# Patient Record
Sex: FEMALE | Race: WHITE
[De-identification: names, ages, dates, MRNs, and addresses within clinical notes are randomized per-mention and may not be internally consistent; named-entity substitution may affect disease eponyms.]

---

## 2018-03-27 ENCOUNTER — HOSPITAL ENCOUNTER (EMERGENCY)
Dept: HOSPITAL 68 - ERH | Age: 38
End: 2018-03-27
Payer: COMMERCIAL

## 2018-03-27 VITALS — SYSTOLIC BLOOD PRESSURE: 119 MMHG | DIASTOLIC BLOOD PRESSURE: 57 MMHG

## 2018-03-27 VITALS — WEIGHT: 250 LBS | HEIGHT: 66 IN | BODY MASS INDEX: 40.18 KG/M2

## 2018-03-27 DIAGNOSIS — Z3A.36: ICD-10-CM

## 2018-03-27 DIAGNOSIS — R19.7: ICD-10-CM

## 2018-03-27 DIAGNOSIS — O21.9: Primary | ICD-10-CM

## 2018-03-27 LAB
ABSOLUTE GRANULOCYTE CT: 10.8 /CUMM (ref 1.4–6.5)
BASOPHILS # BLD: 0 /CUMM (ref 0–0.2)
BASOPHILS NFR BLD: 0.2 % (ref 0–2)
EOSINOPHIL # BLD: 0.1 /CUMM (ref 0–0.7)
EOSINOPHIL NFR BLD: 1.1 % (ref 0–5)
ERYTHROCYTE [DISTWIDTH] IN BLOOD BY AUTOMATED COUNT: 14.9 % (ref 11.5–14.5)
GRANULOCYTES NFR BLD: 86 % (ref 42.2–75.2)
HCT VFR BLD CALC: 37 % (ref 37–47)
LYMPHOCYTES # BLD: 1 /CUMM (ref 1.2–3.4)
MCH RBC QN AUTO: 31 PG (ref 27–31)
MCHC RBC AUTO-ENTMCNC: 33.8 G/DL (ref 33–37)
MCV RBC AUTO: 91.5 FL (ref 81–99)
MONOCYTES # BLD: 0.6 /CUMM (ref 0.1–0.6)
PLATELET # BLD: 319 /CUMM (ref 130–400)
PMV BLD AUTO: 7.7 FL (ref 7.4–10.4)
RED BLOOD CELL CT: 4.04 /CUMM (ref 4.2–5.4)
WBC # BLD AUTO: 12.6 /CUMM (ref 4.8–10.8)

## 2018-03-27 NOTE — ED GENERAL ADULT
History of Present Illness
 
General
Chief Complaint: Nausea, Vomiting, Diarrhea
Stated Complaint: 36 WKS PREGNANT, NVD
Source: patient, family
Exam Limitations: no limitations
Allergies
Coded Allergies:
Sulfa (Sulfonamide Antibiotics) (Intermediate, RASH/HIVES 03/27/18)
adhesive tape (Intermediate, ITCHING 03/27/18)
latex (Intermediate, ITCHY 03/27/18)
 
Reconcile Medications
Cholecalciferol (Vitamin D3) 1,000 UNIT TABLET   1 TAB PO DAILY VITAMIN SUPPORT 
(Reported)
Doxylamine/Pyridoxine HCl (Diclegis Dr 10-10 MG Tablet) 10 MG-10 MG TABLET.DR   
1-2 TAB PO QPM PRN NAUSEA
Glyburide 5 MG TABLET   1 TAB PO DAILY DIABETES  (Reported)
Metformin HCl 500 MG TABLET   2 TAB PO DAILY DIABETES  (Reported)
Nitrofurantoin Monohyd/M-Cryst (Macrobid 100 MG Capsule) 100 MG CAPSULE   1 CAP 
PO BID UTI
     with food
Prenatal Vit No.130/Iron/FA (Prenatal Tablet) 27 MG IRON-800 MCG TABLET   1 TAB 
PO DAILY PREGNANCY  (Reported)
 
Triage Note:
PT TO ED WITH C/O DIARRHEA SINCE YESTERDAY AND
VOMITED X 1 THIS MORNING. PT IS 36 AND 3 DAYS.PT
DENIES ABD CRAMPING OR BLEEDING.
Triage Nurses Notes Reviewed? yes
Onset: Gradual
Duration: day(s): (1)
Timing: remote history
Injury Environment: home
Severity: moderate
No Modifying Factors: none
Associated Symptoms: NAUSEA
Pregnant: Yes
Patient currently breastfeeds: No
HPI:
Patient is a 38-year-old female, currently 36 weeks pregnant presenting to the 
emergency department with chief complaint of nausea vomiting and diarrhea that 
began yesterday evening around 7 PM.  Patient reports approximately 10 episodes 
of loose watery stool that started yesterday.  One episode of vomiting this 
morning.  No blood in the stool or vomit.  Denies any vaginal bleeding or 
discharge.  Denies any urinary frequency urgency or dysuria.  Patient has been 
able to eat a piece of toast and half a cup of Gatorade this morning without 
vomiting.  Last episode of diarrhea or vomiting was around 9 AM this morning.  
Denies any associated abdominal pain.  Her due date is April 16.  This IS HER 
third pregnancy.
(Delbert ROSENBAUM,Loni)
 
Vital Signs & Intake/Output
Vital Signs & Intake/Output
 Vital Signs
 
 
Date Time Temp Pulse Resp B/P B/P Pulse O2 O2 Flow FiO2
 
     Mean Ox Delivery Rate 
 
03/27 1500 98.0 69 20 119/57  99 Room Air  
 
03/27 1340      99 Room Air  
 
03/27 1132 97.6 86 18 129/86  98 Room Air Room Air 
 
 
 
(Vania CEDILLO,Jona SWENSON)
 
Past History
 
Travel History
Traveled to [place] past 21 day No
 
Medical History
Any Pertinent Medical History? see below for history
Neurological: migraine
EENT: NONE
Cardiovascular: NONE
Respiratory: NONE
Gastrointestinal: NONE
Hepatic: NONE
Renal: NONE
Musculoskeletal: NONE
Psychiatric: NONE
Endocrine: NONE
Blood Disorders: NONE
Cancer(s): NONE
 
Surgical History
Surgical History: non-contributory
 
Psychosocial History
What is your primary language English
Tobacco Use: Never used
ETOH Use: denies use
Illicit Drug Use: denies illicit drug use
 
Family History
Hx Contributory? No
(Loni Walton)
 
Review of Systems
 
Review of Systems
Constitutional:
Reports: no symptoms. 
Comments
Review of systems: See HPI, All other systems negative.
Constitutional, no chills fever or weight loss
HEENT: No visual changes no sore throat no congestion
Cardiovascular: No chest pain ,palpitation , orthopnea or ankle swelling
Skin, no jaundice no rashes
Respiratory: No dyspnea cough sputum or hemoptysis
GI: No nausea no vomiting
: No dysuria No hematuria
Muscle skeletal: no back pain, no neck pain,
Neurologic: No numbness no confusion, no headaches
Psych: No stress anxiety or depression,.
Heme/endocrine: No bruising no bleeding no polyuria or polydipsia
Immunology: No splenectomy or history of AIDS
(Loni Walton)
 
Physical Exam
 
Physical Exam
General Appearance: well developed/nourished, no apparent distress, alert, awake
, comfortable
Comments:
Well-developed well-nourished person in no acute distress
HEENT:  Pupils equally round and reactive to light and accommodation. Nose is 
atraumatic. External auditory canal and Tympanic membranes clear. Pharynx 
normal. No swelling or edema.  Moist oral mucosa.
Neck: Full range of motion.  Normal inspection.
Back: Nontender, no CVA tenderness. 
Cardiovascular: Regular rate and rhythms no murmurs rubs or gallops
Respiratory: Chest nontender. No respiratory distress.breath sounds clear to 
auscultation bilaterally
Abdomen: Soft, pregnant abdomen, nontender nondistended, no appreciable 
organomegaly. Normal bowel sounds.  Fetal tones in the 150s confirmed by 
childbirth nurse.
Extremity: No edema, no calf tenderness to palpation, normal and equal pulses.
Neuro: Alert oriented x3
Skin: No appreciable rash on exposed skin, skin is warm and dry.
Psych: Mood and affect is normal, memory and judgment is normal.
 
Core Measures
ACS in differential dx? No
CVA/TIA Diagnosis: No
Sepsis Present: No
Sepsis Focused Exam Completed? No
(Loni Walton)
 
Progress
Differential Diagnoses
I considered the following diagnoses in my evaluation of the patient:  
Dehydration, electrolyte abnormality, gastritis, gastroenteritis, COLITIS
 
Initial ED EKG: none
(Loni Walton)
Plan of Care:
 Orders
 
 
Procedure Date/time Status
 
Add-on Test (ER Only) 03/27 1427 Active
 
URINALYSIS 03/27 1209 Complete
 
LIPASE 03/27 1209 Complete
 
COMPREHENSIVE METABOLIC PANEL 03/27 1209 Complete
 
CBC WITHOUT DIFFERENTIAL 03/27 1209 Complete
 
CULTURE,URINE 03/27 1200 Active
 
 
 Laboratory Tests
 
 
 
03/27/18 1250:
Anion Gap 10, Estimated GFR > 60, BUN/Creatinine Ratio 14.0, Glucose 83, Calcium
9.5, Total Bilirubin 0.5, AST 18, ALT 17, Alkaline Phosphatase 90, Total Protein
6.0  L, Albumin 3.1  L, Globulin 2.9, Albumin/Globulin Ratio 1.1, Lipase 71, CBC
w Diff NO MAN DIFF REQ, RBC 4.04  L, MCV 91.5, MCH 31.0, MCHC 33.8, RDW 14.9  H,
MPV 7.7, Gran % 86.0  H, Lymphocytes % 8.3  L, Monocytes % 4.4, Eosinophils % 
1.1, Basophils % 0.2, Absolute Granulocytes 10.8  H, Absolute Lymphocytes 1.0  L
, Absolute Monocytes 0.6, Absolute Eosinophils 0.1, Absolute Basophils 0, 
Urinalysis LIGHT  H, Urine Color YEL, Urine Clarity CLEAR, Urine pH 6.0, Ur 
Specific Gravity 1.010, Urine Protein NEG, Urine Ketones NEG, Urine Nitrite NEG,
Urine Bilirubin NEG, Urine Urobilinogen 0.2, Ur Leukocyte Esterase MOD  H, Ur 
Microscopic SEDIMENT EXAMINED, Urine WBC 5-10  H, Ur Epithelial Cells MANY  H, 
Urine Bacteria FEW  H, Urine Hemoglobin NEG, Urine Glucose NEG
 Microbiology
03/27 1200  URINE ROUT: Urine Culture - RECD
 
 
 
3/27/2018 3:40:52 PM patient feeling improved after IV hydration, also feeling 
improved after taking DICLEGIS.  Tolerating by mouth fluids and crackers without
nausea or vomiting.  Spoke with Dr. Ocampo, OB/GYN covering for patient's OB/GYN,
recommending patient follow up outpatient with her.  Not toxic.  Discussed with 
Dr. Caro, agrees with plan.
(Loni Walton)
(Vania CEDILLO,Jona SWENSON)
 
Departure
 
Departure
Disposition: HOME OR SELF CARE
Condition: Stable
Clinical Impression
Primary Impression: Nausea vomiting and diarrhea
Referrals:
Mary Hobson (PCP/Family)
 
Jerad Fried MD
 
Additional Instructions:
Follow-up with OB/GYN, call tomorrow.  Return for worsening symptoms or 
concerns. TAKE DICLEGIS, TAKE AS DIRECTED. INCREASE FLUIDS. RETURN FOR WORSENING
SYMPTOMS. 
Departure Forms:
Customer Survey
General Discharge Information
Prescriptions:
Current Visit Scripts
Doxylamine/Pyridoxine HCl (Diclegis Dr 10-10 MG Tablet) 1-2 TAB PO QPM PRN 
NAUSEA 
     #20 TAB 
 
Nitrofurantoin Monohyd/M-Cryst (Macrobid 100 MG Capsule) 1 CAP PO BID  
     #14 CAP 
     with food
 
 
(Loni Walton)
 
PA/NP Co-Sign Statement
Statement:
ED Attending supervision documentation-
 
[x] I saw and evaluated the patient. I have also reviewed all the pertinent lab 
results and diagnostic results. I agree with the findings and the plan of care 
as documented in the PA's/NP's documentation.  Patient presents for evaluation 
of nausea vomiting and diarrhea during pregnancy.  Physical examination reveals 
a gravid abdomen is otherwise nontender.
 
[] I have reviewed the ED Record and agree with the PA's/NP's documentation.
 
[] Additions or exceptions (if any) to the PAs/NP's note and plan are 
summarized below:
[]
 
(Vania CEDILLO,Jona SWENSON)
 
Critical Care Note
 
Critical Care Note
Critical Care Time: non-applicable
(Loni Walton)

## 2018-04-05 ENCOUNTER — HOSPITAL ENCOUNTER (INPATIENT)
Dept: HOSPITAL 68 - GNO | Age: 38
LOS: 2 days | End: 2018-04-07
Attending: OBSTETRICS & GYNECOLOGY | Admitting: OBSTETRICS & GYNECOLOGY
Payer: COMMERCIAL

## 2018-04-05 VITALS — HEIGHT: 67 IN | WEIGHT: 243 LBS | BODY MASS INDEX: 38.14 KG/M2

## 2018-04-05 VITALS — SYSTOLIC BLOOD PRESSURE: 122 MMHG | DIASTOLIC BLOOD PRESSURE: 78 MMHG

## 2018-04-05 DIAGNOSIS — Z3A.37: ICD-10-CM

## 2018-04-05 LAB
ABSOLUTE GRANULOCYTE CT: 11.5 /CUMM (ref 1.4–6.5)
BASOPHILS # BLD: 0 /CUMM (ref 0–0.2)
BASOPHILS NFR BLD: 0.2 % (ref 0–2)
EOSINOPHIL # BLD: 0.1 /CUMM (ref 0–0.7)
EOSINOPHIL NFR BLD: 1 % (ref 0–5)
ERYTHROCYTE [DISTWIDTH] IN BLOOD BY AUTOMATED COUNT: 15.2 % (ref 11.5–14.5)
GRANULOCYTES NFR BLD: 83.5 % (ref 42.2–75.2)
HCT VFR BLD CALC: 36.9 % (ref 37–47)
LYMPHOCYTES # BLD: 1.5 /CUMM (ref 1.2–3.4)
MCH RBC QN AUTO: 31.2 PG (ref 27–31)
MCHC RBC AUTO-ENTMCNC: 34.4 G/DL (ref 33–37)
MCV RBC AUTO: 90.6 FL (ref 81–99)
MONOCYTES # BLD: 0.6 /CUMM (ref 0.1–0.6)
PLATELET # BLD: 331 /CUMM (ref 130–400)
PMV BLD AUTO: 7.8 FL (ref 7.4–10.4)
RED BLOOD CELL CT: 4.07 /CUMM (ref 4.2–5.4)
WBC # BLD AUTO: 13.8 /CUMM (ref 4.8–10.8)

## 2018-04-05 PROCEDURE — 10H073Z INSERTION OF MONITORING ELECTRODE INTO PRODUCTS OF CONCEPTION, VIA NATURAL OR ARTIFICIAL OPENING: ICD-10-PCS | Performed by: OBSTETRICS & GYNECOLOGY

## 2018-04-05 PROCEDURE — 10907ZC DRAINAGE OF AMNIOTIC FLUID, THERAPEUTIC FROM PRODUCTS OF CONCEPTION, VIA NATURAL OR ARTIFICIAL OPENING: ICD-10-PCS | Performed by: OBSTETRICS & GYNECOLOGY

## 2018-04-05 PROCEDURE — 4A1H74Z MONITORING OF PRODUCTS OF CONCEPTION, CARDIAC ELECTRICAL ACTIVITY, VIA NATURAL OR ARTIFICIAL OPENING: ICD-10-PCS | Performed by: OBSTETRICS & GYNECOLOGY

## 2018-04-05 NOTE — PN- OBGYN
Surgical Brief Attending Note
Brief Attending Note:
Seen and evaluated
Castagory 2 tracing.  Variable decelerations noted
8/100/-1 ROP
No molding
IUPC placed
Plan for amnioinfusion to correct variable decelerations. 
Anticipate vaginal birth.
Plan reviewed with Nursing and patient/family.

## 2018-04-05 NOTE — PN- OBGYN
Surgical Brief Attending Note
Brief Attending Note:
Comfortable with epidural
/60
Catagory 1 tracing
VE 5-6/90/-2
Gynecoid pelvis
AROM clear but reduced amount of fluid.
Hernandez placed and clear urine
 
Anticipate vaginal birth
Good labor progress
PPH precautions reviewed with Nursing as well.

## 2018-04-05 NOTE — LABOR & DELIVERY SUMMARY
Delivery Summary
Vaginal Delivery:
   Vaginal: vertex
Episiotomy/Lacerations:
   Episiotomy/Lacerations: 1st degree right labial not bleeding
Placenta:
   Placenta: spontanteous, normal, 3 vessel, nuchal cord (x_), nuchal x 1 
reducible
Anesthesia: epiodural
Apgars - 1 Min: 8
Apgars - 5 Min: 9
Additional Comments:
Cord gas sent
Placenta to pathology given HTN and GDMa2 
Uterus tonic wtih oxytocin
 cc
Rectum intact
Peds present for delivery.

## 2018-04-05 NOTE — PN- OBGYN
Surgical Brief Attending Note
Brief Attending Note:
Patient is a 38 year old  at 37 plus weeks with pregnancy complicated by HTN
, GDMA2 and obesity who is now in early active labor with Catagory 2 tracing.
Patient is making progress and was 1 cm on 18 and 18 was 2-3.
Vitals are 120-80 FSG 80s
 
Plan for 1) Admission for labor  2) Observation for labor changes 3) GBBS 
negative  4) No findings of PIH at present  5) Non LGA fetus and at time of SROM
or AROM potential for meconium  6) GDM so check FSG q2 hours to maintain between
.  Potential for  hypoglycemia was reviewed.  Shoulder precautions
given GDM and obesity.
Full note to follow.
Plan of care reviewed with Nursing team and patient.

## 2018-04-06 LAB
ABSOLUTE GRANULOCYTE CT: 9.7 /CUMM (ref 1.4–6.5)
BASOPHILS # BLD: 0 /CUMM (ref 0–0.2)
BASOPHILS NFR BLD: 0.1 % (ref 0–2)
EOSINOPHIL # BLD: 0.3 /CUMM (ref 0–0.7)
EOSINOPHIL NFR BLD: 2.4 % (ref 0–5)
ERYTHROCYTE [DISTWIDTH] IN BLOOD BY AUTOMATED COUNT: 15.4 % (ref 11.5–14.5)
GRANULOCYTES NFR BLD: 75.7 % (ref 42.2–75.2)
HCT VFR BLD CALC: 35.1 % (ref 37–47)
LYMPHOCYTES # BLD: 1.8 /CUMM (ref 1.2–3.4)
MCH RBC QN AUTO: 31.4 PG (ref 27–31)
MCHC RBC AUTO-ENTMCNC: 33.7 G/DL (ref 33–37)
MCV RBC AUTO: 93.2 FL (ref 81–99)
MONOCYTES # BLD: 1 /CUMM (ref 0.1–0.6)
PLATELET # BLD: 304 /CUMM (ref 130–400)
PMV BLD AUTO: 8.1 FL (ref 7.4–10.4)
RED BLOOD CELL CT: 3.77 /CUMM (ref 4.2–5.4)
WBC # BLD AUTO: 12.8 /CUMM (ref 4.8–10.8)

## 2018-04-06 NOTE — PN- POST DELIVERY/GYN
Subjective
Subjective:
Doing well
Perineal discomfort
 
Review of Systems
Constitutional:
Denies: no symptoms. 
EENTM:
Denies: no symptoms. 
Cardiovascular:
Denies: no symptoms. 
Respiratory:
Denies: no symptoms. 
Gastrointestinal:
Denies: no symptoms. 
Genitourinary:
Denies: no symptoms. 
Musculoskeletal:
Denies: no symptoms. 
Skin:
Denies: no symptoms. 
Neurological/Psychological:
Denies: no symptoms. 
Hematologic/Endocrine:
Denies: no symptoms. 
Immunologic/Allergic:
Denies: no symptoms. 
 
Objective
Last 24 Hrs of Vital Signs/I&O
 Vital Signs
 
 
Date Time Temp Pulse Resp B/P B/P Pulse O2 O2 Flow FiO2
 
     Mean Ox Delivery Rate 
 
04/05 1035    122/78     
 
 
 
 
Physical Exam
General Appearance Alert, Oriented X3, Cooperative, No Acute Distress
Skin No Rashes
HEENT Atraumatic
Neck Supple
Cardiovascular Regular Rate
Lungs Clear to Auscultation, Normal Air Movement
Abdomen Normal Bowel Sounds, Soft
Neurological Normal Gait, Normal Speech, Strength at 5/5 X4 Ext, Normal Tone, 
Sensation Intact, Cranial Nerves 3-12 NL
Extremities Edmea bilaterally 1-2+
Breasts Breast appear nl
Current Medications:
 Current Medications
 
 
  Sig/Brody Start time  Last
 
Medication Dose Route Stop Time Status Admin
 
Acetaminophen 650 MG Q4P PRN 04/05 1515 AC 
 
  PO   
 
Bupivacaine HCl 10 ML .STK-MED ONE 04/05 1458 DC 
 
  EPID 04/05 1459  
 
Hydroxyzine HCl 50 MG AT BEDTIME AS NEED.. 04/05 1515 AC 
 
  PO   
 
Ibuprofen 800 MG .STK-MED ONE 04/06 0146 DC 
 
  PO 04/06 0147  
 
Ibuprofen 800 MG Q6P PRN 04/05 1515 AC 04/06
 
  PO   0734
 
Lactated Ringer's 1,000 ML Q8H 04/05 1000 DC 04/05
 
  IV   1447
 
Methylergonovine  0.2 MG ONCE ONE 04/05 1715 DC 
 
Maleate  IM 04/05 1716  
 
Multivitamins 1 TAB DAILY 04/06 1000 AC 
 
  PO   
 
Oxytocin 20 UNITS Q5H 04/05 1515 DC 
 
Lactated Ringer's 1,000 ML IV 04/05 2014  
 
 
 
Last 24 Hrs of Labs/Zack:
 Laboratory Tests
 
04/06/18 0730:
CBC w Diff NO MAN DIFF REQ, RBC 3.77  L, MCV 93.2, MCH 31.4  H, MCHC 33.7, RDW 
15.4  H, MPV 8.1, Gran % 75.7  H, Lymphocytes % 13.7  L, Monocytes % 8.1, 
Eosinophils % 2.4, Basophils % 0.1, Absolute Granulocytes 9.7  H, Absolute 
Lymphocytes 1.8, Absolute Monocytes 1.0  H, Absolute Eosinophils 0.3, Absolute 
Basophils 0
 Microbiology
04/05 1215  URINE ROUT: Urine Culture - RES
 
 
 
Assessment/Plan
Assessment/Plan
Postpartum
History of GDMA2
History of HTN
Obesity
 
Follow up 6-12 weeks for 75 g GTT vs fasting glucose to assess for overy 
diabetes
Non breast feeding
Nutritional counseling.  Gained 50 lbs. 
GBBS negative and no findings of infection
VTE risk reduction with ambulation
BP is wnl.  No need for anti HTN meds at present
DC planning for 4/7/18 and circumcision today
4 week follow up and IUD at 6 weeks.
Problem List:
 1. History of gestational diabetes
 
 2. Pregnancy
 
 
Attending MD Review Statement
 
Attending Statement
Attending MD Statement: examined this patient, discussed with nursing
Attending Assessment/Plan:
As outlined
Up to date with Influenza and Tdap vaccines

## 2018-04-10 ENCOUNTER — HOSPITAL ENCOUNTER (INPATIENT)
Dept: HOSPITAL 68 - ERH | Age: 38
LOS: 2 days | DRG: 776 | End: 2018-04-12
Attending: OBSTETRICS & GYNECOLOGY | Admitting: OBSTETRICS & GYNECOLOGY
Payer: COMMERCIAL

## 2018-04-10 VITALS — WEIGHT: 248 LBS | HEIGHT: 66 IN | BODY MASS INDEX: 39.86 KG/M2

## 2018-04-10 DIAGNOSIS — Z91.040: ICD-10-CM

## 2018-04-10 DIAGNOSIS — E83.51: ICD-10-CM

## 2018-04-10 DIAGNOSIS — E83.39: ICD-10-CM

## 2018-04-10 DIAGNOSIS — E83.41: ICD-10-CM

## 2018-04-10 DIAGNOSIS — O99.89: ICD-10-CM

## 2018-04-10 DIAGNOSIS — Z91.048: ICD-10-CM

## 2018-04-10 DIAGNOSIS — Z88.2: ICD-10-CM

## 2018-04-10 DIAGNOSIS — G43.909: ICD-10-CM

## 2018-04-10 LAB
ABSOLUTE GRANULOCYTE CT: 5.5 /CUMM (ref 1.4–6.5)
BASOPHILS # BLD: 0.1 /CUMM (ref 0–0.2)
BASOPHILS NFR BLD: 0.7 % (ref 0–2)
EOSINOPHIL # BLD: 0.7 /CUMM (ref 0–0.7)
EOSINOPHIL NFR BLD: 7.9 % (ref 0–5)
ERYTHROCYTE [DISTWIDTH] IN BLOOD BY AUTOMATED COUNT: 14.8 % (ref 11.5–14.5)
GRANULOCYTES NFR BLD: 58.4 % (ref 42.2–75.2)
HCT VFR BLD CALC: 35.7 % (ref 37–47)
LYMPHOCYTES # BLD: 2.5 /CUMM (ref 1.2–3.4)
MCH RBC QN AUTO: 30.6 PG (ref 27–31)
MCHC RBC AUTO-ENTMCNC: 33.1 G/DL (ref 33–37)
MCV RBC AUTO: 92.5 FL (ref 81–99)
MONOCYTES # BLD: 0.7 /CUMM (ref 0.1–0.6)
PLATELET # BLD: 387 /CUMM (ref 130–400)
PMV BLD AUTO: 7.3 FL (ref 7.4–10.4)
RED BLOOD CELL CT: 3.86 /CUMM (ref 4.2–5.4)
WBC # BLD AUTO: 9.4 /CUMM (ref 4.8–10.8)

## 2018-04-10 NOTE — ED GENERAL ADULT
History of Present Illness
 
General
Chief Complaint: General Adult
Stated Complaint: SIB DR, HAD A BABY 5 DAYS AGO:HA, HIGH BP
Source: patient
Exam Limitations: no limitations
 
Vital Signs & Intake/Output
Vital Signs & Intake/Output
 Vital Signs
 
 
Date Time Temp Pulse Resp B/P B/P Pulse O2 O2 Flow FiO2
 
     Mean Ox Delivery Rate 
 
 0302 97.2 98 18 133/80  97 Room Air  
 
 0232 97.1 83 18 133/79  97 Room Air  
 
 0202 98.0 76 18 142/77  97 Room Air  
 
 0131 98.0 81 18 136/72  97 Room Air  
 
 0116 98.0 64 18 147/79  96 Room Air  
 
 0101 98.0 70 18 161/79  98 Room Air  
 
 0047 98.0 65 18 157/75  98 Room Air  
 
 0044  56 18 164/77  97 Room Air  
 
 0000  63 18 157/73  98 Room Air  
 
04/10 2331      99 Room Air  
 
04/10 2247    156/88     
 
04/10 2141 97.7 66 20 161/99  98 Room Air  
 
 
 ED Intake and Output
 
 
  0000 04/10 1200
 
Intake Total  
 
Output Total  
 
Balance  
 
   
 
Patient 248 lb 
 
Weight  
 
 
 
Allergies
Coded Allergies:
Sulfa (Sulfonamide Antibiotics) (Intermediate, RASH/HIVES 04/10/18)
adhesive tape (Intermediate, ITCHING 04/10/18)
latex (Intermediate, ITCHY 04/10/18)
 
Reconcile Medications
Cholecalciferol (Vitamin D3) 1,000 UNIT TABLET   1 TAB PO DAILY VITAMIN SUPPORT 
(Reported)
Prenatal Vit No.130/Iron/FA (Prenatal Tablet) 27 MG IRON-800 MCG TABLET   1 TAB 
PO DAILY PREGNANCY  (Reported)
 
Triage Note:
PT FROM HOME C/O HTN POST PARTUM 5XDAYS. PT STATES
SHE HAS A HX OF HTN AND HAS BEEN MONITORING HER BP
AT HOME AND NOTICED A SLIGHT ELEVATION. PT STATES
HA THAT BEGAN TODAY AND BILATERAL LEG SWELLING. PT
DENIES BLURRY VISION. VSS.
Triage Nurses Notes Reviewed? yes
Pregnant: No
Patient currently breastfeeds: No
HPI:
39 yo woman, h/o hypertension, 
vaginal delivery 5 days ago,
presents with headache and increased blood pressure.
 
"I was off my blood pressure meds when I was pregnant.... everything was fine...
then today, I checked my blood pressure and it started going up and up and my 
headache got worse.  I took tylenol all day and my headache didn't get better."
 
No fever, chills, shortness of breath, dyspnea, chest pain.  
 
Past History
 
Travel History
Traveled to [place] past 21 day No
 
Medical History
Any Pertinent Medical History? see below for history
Neurological: migraine
EENT: NONE
Cardiovascular: NONE
Respiratory: NONE
Gastrointestinal: NONE
Hepatic: NONE
Renal: NONE
Musculoskeletal: NONE
Psychiatric: NONE
Endocrine: NONE
Blood Disorders: NONE
Cancer(s): NONE
 
Surgical History
Surgical History: non-contributory
 
Psychosocial History
What is your primary language English
Tobacco Use: Quit >30 days ago
 
Family History
Hx Contributory? No
 
Review of Systems
 
Review of Systems
Constitutional:
Reports: no symptoms. 
EENTM:
Reports: no symptoms. 
Respiratory:
Reports: no symptoms. 
Cardiovascular:
Reports: no symptoms. 
GI:
Reports: no symptoms. 
Genitourinary:
Reports: no symptoms. 
Musculoskeletal:
Reports: no symptoms. 
Skin:
Reports: no symptoms. 
Neurological/Psychological:
Reports: no symptoms. 
Hematologic/Endocrine:
Reports: no symptoms. 
Immunologic/Allergic:
Reports: no symptoms. 
All Other Systems: Reviewed and Negative
 
Physical Exam
 
Physical Exam
General Appearance: well developed/nourished, anxious
Head: atraumatic, normal appearance
Eyes:
Bilateral: normal appearance, PERRL, EOMI. 
Ears, Nose, Throat: normal pharynx, normal ENT inspection
Neck: normal inspection, supple, full range of motion
Respiratory: normal breath sounds, chest non-tender, no respiratory distress, 
quiet respiration, lungs clear
Cardiovascular: regular rate/rhythm
Gastrointestinal: normal bowel sounds, soft
Back: normal inspection
Extremities: 2-3+ pedal edema, symmetric
Neurologic/Psych: no motor/sensory deficits, awake, alert, oriented x 3
Skin: intact, normal color, warm/dry
 
Core Measures
ACS in differential dx? No
CVA/TIA Diagnosis: No
Sepsis Present: No
Sepsis Focused Exam Completed? No
 
Progress
Differential Diagnoses
I considered the following diagnoses in my evaluation of the patient: 
pre-eclampsia,
hypertension vs other. 
 
Plan of Care:
 Orders
 
 
Procedure Date/time Status
 
Clear Liquid Diet  B Active
 
Patient Data  0233 Active
 
Hernandez, Insertion/Removal/Asses  0017 Active
 
CULTURE,URINE  0017 Active
 
Admit to inpatient   UNK Active
 
VTE Mechanical Prophylaxis   UNK Active
 
Admit to inpatient 04/10 2357 Active
 
Intake & Output 04/10 2330 Active
 
URINALYSIS 04/10 2143 Complete
 
TROPONIN LEVEL 04/10 2143 Complete
 
MAGNESIUM 04/10 2143 Complete
 
COMPREHENSIVE METABOLIC PANEL 04/10 2143 Complete
 
CBC WITHOUT DIFFERENTIAL 04/10 2143 Complete
 
EKG 04/10 2143 Active
 
 
 Current Medications
 
 
  Sig/Brody Start time  Last
 
Medication Dose  Stop Time Status Admin
 
Cholecalciferol 1,000 IU DAILY  1000 AC 
 
(Vitamin D)     
 
Multivitamins 1 TAB DAILY  1000 AC 
 
(PRENATAL VITAMINS)     
 
Acetaminophen 650 MG Q4P PRN  0030 AC 
 
(Tylenol)     
 
Ibuprofen 400 MG Q6P PRN  0030 AC 
 
(Motrin)     
 
Magnesium Sulfate 40 GM ONCE ONE  0015 AC 
 
(Mag Sulfate    2014  0135
 
Infusion 40MG/ML)     
 
Sterile Water 1,000 ML    
 
(STERILE H2O For inj)     
 
Labetalol HCl 10 MG ONCE ONE 04/10 234 CAN 
 
(Trandate)   04/10 2346  
 
Lactated Ringer's 1,000 ML Q20H 04/10 234 AC 
 
(Lactated Ringers)     0104
 
Magnesium Sulfate 1 GM ONCE ONE 04/10 2345 CAN 
 
(Mag Sulfate in D5)    0344  
 
Dextrose/Water 100 ML    
 
(D5W)     
 
Magnesium Sulfate 4 GM ONCE ONE 04/10 2345 CAN 
 
(Mag Sulfate in D5)    0014  
 
Dextrose/Water 100 ML    
 
(D5W)     
 
Non-Formulary  0 SEE ADMIN CRITERIA 04/10 234 CAN 
 
Medication     
 
(NON FORMULARY)     
 
 
 Laboratory Tests
 
 
 
04/10/18 2212:
Urine Color YEL, Urine Clarity CLEAR, Urine pH 6.5, Ur Specific Gravity 1.015, 
Urine Protein NEG, Urine Ketones NEG, Urine Nitrite NEG, Urine Bilirubin NEG, 
Urine Urobilinogen 0.2, Ur Leukocyte Esterase SMALL  H, Ur Microscopic SEDIMENT 
EXAMINED, Urine RBC 5-10  H, Urine WBC 3-5  H, Ur Epithelial Cells FEW, Urine 
Hemoglobin LARGE  H, Urine Glucose NEG
 
04/10/18 2208:
Anion Gap 12, Estimated GFR > 60, BUN/Creatinine Ratio 28.3  H, Glucose 101  H, 
Calcium 9.7, Magnesium 1.5  L, Total Bilirubin 0.3, AST 24, ALT 36, Alkaline 
Phosphatase 80, Troponin I 0.02, Total Protein 6.0  L, Albumin 3.3  L, Globulin 
2.7, Albumin/Globulin Ratio 1.2, CBC w Diff NO MAN DIFF REQ, RBC 3.86  L, MCV 
92.5, MCH 30.6, MCHC 33.1, RDW 14.8  H, MPV 7.3  L, Gran % 58.4, Lymphocytes % 
26.0, Monocytes % 7.0, Eosinophils % 7.9  H, Basophils % 0.7, Absolute 
Granulocytes 5.5, Absolute Lymphocytes 2.5, Absolute Monocytes 0.7  H, Absolute 
Eosinophils 0.7, Absolute Basophils 0.1
 Microbiology
137  URINE ROUT: Urine Culture - RECD
 
 
Diagnostic Imaging:
Viewed by Me: Radiology Read.  Discussed w/RAD: Radiology Read. 
CXR Impression: PATIENT: VAHE ANDERSON  MEDICAL RECORD NO: [id number] PRESENT 
AGE: 38  PATIENT ACCOUNT NO: [id number] : 80  LOCATION: [facility] ORDERING 
PHYSICIAN: Andrade Melo MD     SERVICE DATE: 04/10/ EXAM TYPE: 
RAD - XRY-PORTABLE CHEST XRAY EXAMINATION: XR PORTABLE CHEST CLINICAL 
INFORMATION: Hypertension. Headache. COMPARISON: None TECHNIQUE: Portable 
frontal view of the chest was obtained. FINDINGS: Lung volumes are low. There is
no dense consolidation, no edema or effusion. No pneumothorax. The 
cardiomediastinal silhouette is within normal limits. No acute osseous 
abnormality. IMPRESSION: Hypoexpanded lungs with no consolidation. DICTATED BY: 
Sanchez Hill MD  DATE/TIME DICTATED:04/10/18 / 2359 :
[facility]  DATE/TIME TRANSCRIBED:04/10/18 / 2359 CONFIDENTIAL, DO NOT COPY 
WITHOUT APPROPRIATE AUTHORIZATION.  <Electronically signed in Other Vendor 
System>                                                                         
              SIGNED BY: Sanchez Hill MD 18 0010
Initial ED EKG: nsr, no acute changes
 
Departure
 
Departure
Disposition: HOME OR SELF CARE
Condition: Stable
Clinical Impression
Primary Impression: Pre-eclampsia
Secondary Impressions: Headache, Hypertension
Referrals:
Mary Hobson (PCP/Family)
 
Departure Forms:
Customer Survey
General Discharge Information
Comments
4/10/18, 23:55... discussed with dr. jerad mccarthy who suggests magnesium 
sulfate 4gm iv bolus and then 2gm/hr. 
 
Admission Note
Spoke With:
Jerad Mccarthy MD
Documentation of Exam:
Documentation of any treatments & extenuating circumstances including Concerns 
Regarding Discharge (functional status, medication knowledge or non-compliance, 
living conditions, etc.) that warrant an admission rather than observation: 
 
pt with elevated blood pressure with symptoms of post-partum pre-eclampsia (
elevated blood pressure with severe headache)... pt merts magnesium sulfate drip
, close blood pressure monitoring. 
 
Dr. Mccarthy suggested head ct not necessary. 
 
 
Critical Care Note
 
Critical Care Note
Critical Care Time: 30-74 min

## 2018-04-11 VITALS — SYSTOLIC BLOOD PRESSURE: 116 MMHG | DIASTOLIC BLOOD PRESSURE: 74 MMHG

## 2018-04-11 VITALS — SYSTOLIC BLOOD PRESSURE: 120 MMHG | DIASTOLIC BLOOD PRESSURE: 62 MMHG

## 2018-04-11 VITALS — SYSTOLIC BLOOD PRESSURE: 120 MMHG | DIASTOLIC BLOOD PRESSURE: 68 MMHG

## 2018-04-11 LAB
ABSOLUTE GRANULOCYTE CT: 10.1 /CUMM (ref 1.4–6.5)
ABSOLUTE GRANULOCYTE CT: 7.4 /CUMM (ref 1.4–6.5)
BASOPHILS # BLD: 0 /CUMM (ref 0–0.2)
BASOPHILS # BLD: 0 /CUMM (ref 0–0.2)
BASOPHILS NFR BLD: 0.1 % (ref 0–2)
BASOPHILS NFR BLD: 0.3 % (ref 0–2)
EOSINOPHIL # BLD: 0.4 /CUMM (ref 0–0.7)
EOSINOPHIL # BLD: 0.7 /CUMM (ref 0–0.7)
EOSINOPHIL NFR BLD: 3.6 % (ref 0–5)
EOSINOPHIL NFR BLD: 6.7 % (ref 0–5)
ERYTHROCYTE [DISTWIDTH] IN BLOOD BY AUTOMATED COUNT: 14.9 % (ref 11.5–14.5)
ERYTHROCYTE [DISTWIDTH] IN BLOOD BY AUTOMATED COUNT: 15 % (ref 11.5–14.5)
GRANULOCYTES NFR BLD: 68.6 % (ref 42.2–75.2)
GRANULOCYTES NFR BLD: 81.9 % (ref 42.2–75.2)
HCT VFR BLD CALC: 38.5 % (ref 37–47)
HCT VFR BLD CALC: 39.7 % (ref 37–47)
LYMPHOCYTES # BLD: 1.2 /CUMM (ref 1.2–3.4)
LYMPHOCYTES # BLD: 2 /CUMM (ref 1.2–3.4)
MCH RBC QN AUTO: 30.7 PG (ref 27–31)
MCH RBC QN AUTO: 31.1 PG (ref 27–31)
MCHC RBC AUTO-ENTMCNC: 33.3 G/DL (ref 33–37)
MCHC RBC AUTO-ENTMCNC: 34 G/DL (ref 33–37)
MCV RBC AUTO: 91.8 FL (ref 81–99)
MCV RBC AUTO: 92 FL (ref 81–99)
MONOCYTES # BLD: 0.6 /CUMM (ref 0.1–0.6)
MONOCYTES # BLD: 0.7 /CUMM (ref 0.1–0.6)
PLATELET # BLD: 408 /CUMM (ref 130–400)
PLATELET # BLD: 426 /CUMM (ref 130–400)
PMV BLD AUTO: 7.4 FL (ref 7.4–10.4)
PMV BLD AUTO: 7.6 FL (ref 7.4–10.4)
RED BLOOD CELL CT: 4.19 /CUMM (ref 4.2–5.4)
RED BLOOD CELL CT: 4.32 /CUMM (ref 4.2–5.4)
WBC # BLD AUTO: 10.8 /CUMM (ref 4.8–10.8)
WBC # BLD AUTO: 12.4 /CUMM (ref 4.8–10.8)

## 2018-04-11 NOTE — RADIOLOGY REPORT
EXAMINATION:
XR PORTABLE CHEST
 
CLINICAL INFORMATION:
Hypertension. Headache.
 
COMPARISON:
None
 
TECHNIQUE:
Portable frontal view of the chest was obtained.
 
FINDINGS:
Lung volumes are low. There is no dense consolidation, no edema or effusion.
No pneumothorax. The cardiomediastinal silhouette is within normal limits. No
acute osseous abnormality.
 
IMPRESSION:
Hypoexpanded lungs with no consolidation.

## 2018-04-11 NOTE — PN- RESIDENT CRCU
Subjective
HPI/CRCU Issues:
She is comfortable this morning.  Did not have any new concerns.  She had mild 
headache, which improved during the day which was largely attributed to her 
migraines.  Vitals remained stable overnight.  Blood pressure was adequately 
controlled which was less than 150/100.
24 Hour Events:
 
Temperature 98.3, heart rate , normal sinus rhythm, respiration 13-27, 
blood pressure 116-130/72-82.  Input 1275, output 1275.  Had adequate diuresis.
 
Objective
 
Vital Signs & I&O
Last 8 Hrs of Vitals and I&O:
 Intake & Output
 
 
  1600
 
Intake Total 3650
 
Output Total 1200
 
Balance 2450
 
  
 
Intake, 
 
Intake, Oral 3000
 
Output, Urine 1200
 
 
 
 
Exam
General Appearance: awake
Other Physical Findings:
General Exam: AAOx3, No acute distress, 
Skin: No rashes, no breakdown
HEENT: PERRLA, EOMI
Neck: Supple, No JVD
No cervical lymphadenopathy
CVS: Reg Rate, Normal S1,S2, No MGR
Resp: Normal air entry, no ronchi/rales
Abdomen: Soft, No tenderness, Normal Bowel Sounds
Neuro: Normal Speech, Strength 5/5 b/l x 4 extremities, Sensation intact, CN III
-XII NL, Reflexes 2+
Extremities: No cyanosis, trace pedal edema
 
Current Medications:
 Current Medications
 
 
  Sig/Brody Start time  Last
 
Medication Dose Route Stop Time Status Admin
 
Acetaminophen 650 MG Q4P PRN  0030 AC 
 
  PO   1420
 
Calcium Gluconate 0 .STK-MED ONE  0037 DC 
 
  IV   
 
Cholecalciferol 1,000 IU DAILY  1000 AC 
 
  PO   1032
 
Clonazepam 0.5 MG BID PRN  0900 AC 
 
  PO  0859  1033
 
Cyproheptadine HCl 4 MG TID PRN  0900 AC 
 
  PO   1641
 
Enoxaparin Sodium 40 MG DAILY  1000 AC 
 
  SC   1620
 
Ibuprofen 400 MG Q6P PRN  0030 AC 
 
  PO   1420
 
Labetalol HCl 100 MG BID  1000 AC 
 
  PO   1033
 
Labetalol HCl 0 .STK-MED ONE 04/10 2354 DC 
 
  IV   
 
Labetalol HCl 10 MG ONCE ONE 04/10 2345 CAN 
 
  IV 04/10 2346  
 
Lactated Ringer's 1,000 ML Q20H 04/10 2345 DC 
 
  IV   0104
 
Magnesium Sulfate 2.5 GM SEE RATE  0930 CAN 
 
  IV   
 
Magnesium Sulfate 40 GM ONE  0930 DC 
 
Water 1,000 ML IV   
 
Magnesium Sulfate 4 GM ONCE ONE  0015 DC 
 
Sterile Water 100 ML IV  0044  0046
 
Magnesium Sulfate 40 GM ONCE ONE  0015 DC 
 
Sterile Water 1,000 ML IV 2014  013
 
Magnesium Sulfate 1 GM ONCE ONE 04/10 2345 CAN 
 
Dextrose/Water 100 ML IV  0344  
 
Magnesium Sulfate 4 GM ONCE ONE 04/10 2345 CAN 
 
Dextrose/Water 100 ML IV  0014  
 
Multivitamins 1 TAB DAILY  1000 AC 
 
  PO   1033
 
Nifedipine 10 MG STAT STA  0016 DC 
 
  PO  0017  0104
 
Non-Formulary  0 SEE ADMIN CRITERIA 04/10 234 CAN 
 
Medication  ANY   
 
 
 
 
Impression/Plan
 
Impression/Problem List
Impression:
 
Ms El is a 38-year-old woman with past medical history hypertension, 
obesity, gestational diabetes (GDMa2 who is  who is PPD #6 s/p vaginal birth who
came in with a chief concern of persistent headache despite taking Tylenol and 
also had vision changes.  Blood pressure was recorded to be around 130-160s/80-
90s.  She did not have any abdominal pain, especially right upper quadrant pain.
 She is not breast-feeding. 
 
She was admitted to ICU she and was treated with magnesium, one dose of 
nifedipine 10 mg. Her blood pressure improved and stayed in between systolic 120
-130s.  She also has been diuresing well.
 
Vitals at 8 AM-temperature 98.3, pulse rate 90, respiration 18, blood pressure 1
1 6/74, 98% on room air.  Repeat vitals at 2 PM revealed temperature 98.8, pulse
rate 88, respiration 15, blood pressure 120/68, 98% on room air.  Last blood 
pressure that was checked in 4 PM revealed 126/86.
 
Pertinent lab findings:
 
WBC 9.4, hemoglobin 11.8, platelets 387.  Potassium 3.5, BUN 17, creatinine 0.6,
AST 24, ALT 37, magnesium 1.5-->3.4-->4.7.
 
Etiology in her case was likely preeclampsia with postpartum day 6 with 
hypertension and now mild CNS symptoms (headache), which would be eclampsia.  
She was admitted to intensive care unit for further monitoring of blood pressure
and any seizures.
 
Problem list:
 
#1 eclampsia
#2 hypertension
#3 migraine
 
Plan:
 
#1  Eclampsia-she was started on magnesium infusion, with close monitoring of 
her diuresis.  She has been adequately diuresed, and last magnesium check was 
4.7.  Considering her duration of magnesium treatment, it was discontinued 
during the day by Dr. Fried.  In regards to her hypertension, she was started 
on labetalol p.o. 100 mg twice daily with the goal of blood pressure less than 
150/100.  If the blood pressures continues to rise, the dose of labetalol could 
be increased.  Recheck magnesium, BMP, CBC in the next 8 hours to evaluate for 
help syndrome.
Next labs to be done in the a.m., as per Dr. Fried.
 
Housekeeping:
 
#1 DVT prophylaxis-subcutaneous Lovenox
#2 Hernandez catheter-discontinued
#3 IV line-peripheral-magnesium discontinued
 
 
 
 
Problem List:
 1. Preeclampsia in postpartum period
 
 2. Hypertension
 
 3. Headache
 
Pain Ratin
Tomorrow's Labs & Rationales:
cbc
bep
 
Plan
DVT/Prophylaxis: pharmacological

## 2018-04-11 NOTE — CONS- OBGYN
General Information and HPI
 
Consulting Request
Date of Consult: 18
Requested By:
Dr Melo
 
Reason for Consult:
Postpartum HTN
Source of Information: patient
Exam Limitations: no limitations
History of Present Illness:
Patient is a 38 year old female who is PPD #6 s/p vaginal birth.  Pregnancy 
complicated by history of HTN, GDMa2 and obesity.  She was in usual state of 
health today when noticed persistant headache despite tylenol and visual 
changes.  She also noted her BP today to be between 130-160/80-90.
Denies RUQ pain.
Mild cramps 
Mild lochia.
Not breast feeding.
Swollen legs
 
Allergies/Medications
Allergies:
Coded Allergies:
Sulfa (Sulfonamide Antibiotics) (Intermediate, RASH/HIVES 04/10/18)
adhesive tape (Intermediate, ITCHING 04/10/18)
latex (Intermediate, ITCHY 04/10/18)
 
Home Med List:
Cholecalciferol (Vitamin D3) 1,000 UNIT TABLET   1 TAB PO DAILY VITAMIN SUPPORT 
(Reported)
Prenatal Vit No.130/Iron/FA (Prenatal Tablet) 27 MG IRON-800 MCG TABLET   1 TAB 
PO DAILY PREGNANCY  (Reported)
 
Current Medications:
 Current Medications
 
 
  Sig/Brody Start time  Last
 
Medication Dose Route Stop Time Status Admin
 
Acetaminophen 650 MG Q4P PRN 30 UNVr 
 
  PO   
 
Calcium Gluconate 0 .STK-MED ONE 37 DC 
 
  IV   
 
Cholecalciferol 1,000 IU DAILY  1000 UNVr 
 
  PO   
 
Ibuprofen 400 MG Q6P PRN  003 UNVr 
 
  PO   
 
Labetalol HCl 0 .STK-MED ONE 04/10 2354 DC 
 
  IV   
 
Labetalol HCl 10 MG ONCE ONE 04/10 2345 CAN 
 
  IV 04/10 234  
 
Lactated Ringer's 1,000 ML Q20H 04/10 234 AC 
 
  IV   
 
Magnesium Sulfate 4 GM ONCE ONE 5 AC 
 
Sterile Water 100 ML IV  0044  
 
Magnesium Sulfate 40 GM ONCE ONE 5 AC 
 
Sterile Water 1,000 ML IV 2014  
 
Magnesium Sulfate 1 GM ONCE ONE 04/10 2345 CAN 
 
Dextrose/Water 100 ML IV  0344  
 
Magnesium Sulfate 4 GM ONCE ONE 04/10 2345 CAN 
 
Dextrose/Water 100 ML IV 14  
 
Multivitamins 1 TAB DAILY  1000 UNVr 
 
  PO   
 
Nifedipine 10 MG STAT STA  0016 DC 
 
  PO 17  
 
Non-Formulary  0 SEE ADMIN CRITERIA 04/10 5435 CAN 
 
Medication  ANY   
 
 
 
 
Past History
 
Medical History
Blood Transfusion Hx: No
Neurological: migraine
EENT: NONE
Cardiovascular: NONE
Respiratory: NONE
Gastrointestinal: NONE
Hepatic: NONE
Renal: NONE
Musculoskeletal: NONE
Psychiatric: NONE
Endocrine: NONE
Blood Disorders: NONE
Cancer(s): NONE
GYN/Reproductive: NONE
Other Medical Hx:
GDMA2
History of  birth
 
 
Surgical History
Pertinent Surgical History: non-contributory
 
Psychosocial History
Where Do You Live? Home
Who Do You Live With? spouse, child
Services at Home: None
Primary Language: English
Smoking Status: Never Smoked
ETOH Use: denies use
Illicit Drug Use: denies illicit drug use
Living Will? unknown
Power of /HCP? unknown
Other Social History:
NA
 
Functional Ability
ADLs
Independent: dressing, eating, toileting, bathing. 
Ambulation: independent
IADLs
Independent: shopping, housework, finances, food prep, telephone, transportation
, medication admin. 
 
Employment History
Employment: Employed
Retired? no
 
Review of Systems
 
Review of Systems
Constitutional:
Denies: no symptoms. 
EENTM:
Reports: visual changes. 
Cardiovascular:
Reports: peripheral edema. 
Respiratory:
Denies: no symptoms. 
GI:
Denies: no symptoms. 
Genitourinary:
Denies: no symptoms. 
Musculoskeletal:
Denies: no symptoms. 
Skin:
Denies: no symptoms. 
Neurological/Psychological:
Reports: anxiety, headache. 
Hematologic/Endocrine:
Denies: no symptoms. 
Immunologic/Allergic:
Denies: no symptoms. 
 
Exam & Diagnostic Data
Vital Signs and I&O
Vital Signs
 
 
Date Time Temp Pulse Resp B/P B/P Pulse O2 O2 Flow FiO2
 
     Mean Ox Delivery Rate 
 
 0000  63 18 157/73  98 Room Air  
 
04/10 2331      99 Room Air  
 
04/10 2247    156/88     
 
04/10 2141 97.7 66 20 161/99  98 Room Air  
 
 
 Intake & Output
 
 
  0800  0000 04/10 1600 04/10 0800 04/10 0000  1600
 
Intake Total      
 
Output Total      
 
Balance      
 
       
 
Patient  112.491 kg    
 
Weight      
 
 
 
 
Physical Exam
General Appearance: well developed/nourished, mild distress
Head: atraumatic
Eyes:
Bilateral: normal appearance. 
Neck: full range of motion
Respiratory: normal breath sounds
Cardiovascular: regular rate/rhythm
Gastrointestinal: soft, non-tender
Back: normal inspection
Extremities: Edema 1-2+  negative tonia's
Neurologic/Psych: no motor/sensory deficits, awake, alert, oriented x 3, normal 
mood/affect (reflexes 2+)
Cranial Nerves: normal hearing, normal speech
Skin: intact, normal color
Last 24 Hours of Labs:
 Laboratory Tests
 
 
 04/10 04/10
 
 2212 2208
 
Chemistry  
 
  Sodium (137 - 145 mmol/L)  140
 
  Potassium (3.5 - 5.1 mmol/L)  3.5
 
  Chloride (98 - 107 mmol/L)  102
 
  Carbon Dioxide (22 - 30 mmol/L)  26
 
  Anion Gap (5 - 16)  12
 
  BUN (7 - 17 mg/dL)  17
 
  Creatinine (0.5 - 1.0 mg/dL)  0.6
 
  Estimated GFR (>60 ml/min)  > 60
 
  BUN/Creatinine Ratio (7 - 25 %)  28.3  H
 
  Glucose (65 - 99 mg/dL)  101  H
 
  Calcium (8.4 - 10.2 mg/dL)  9.7
 
  Magnesium (1.6 - 2.3 mg/dL)  1.5  L
 
  Total Bilirubin (0.2 - 1.3 mg/dL)  0.3
 
  AST (14 - 36 U/L)  24
 
  ALT (9 - 52 U/L)  36
 
  Alkaline Phosphatase (<127 U/L)  80
 
  Troponin I (< 0.11 ng/ml)  0.02
 
  Total Protein (6.3 - 8.2 g/dL)  6.0  L
 
  Albumin (3.5 - 5.0 g/dL)  3.3  L
 
  Globulin (1.9 - 4.2 gm/dL)  2.7
 
  Albumin/Globulin Ratio (1.1 - 2.2 %)  1.2
 
Hematology  
 
  CBC w Diff  NO MAN DIFF REQ
 
  WBC (4.8 - 10.8 /CUMM)  9.4
 
  RBC (4.20 - 5.40 /CUMM)  3.86  L
 
  Hgb (12.0 - 16.0 G/DL)  11.8  L
 
  Hct (37 - 47 %)  35.7  L
 
  MCV (81.0 - 99.0 FL)  92.5
 
  MCH (27.0 - 31.0 PG)  30.6
 
  MCHC (33.0 - 37.0 G/DL)  33.1
 
  RDW (11.5 - 14.5 %)  14.8  H
 
  Plt Count (130 - 400 /CUMM)  387
 
  MPV (7.4 - 10.4 FL)  7.3  L
 
  Gran % (42.2 - 75.2 %)  58.4
 
  Lymphocytes % (20.5 - 51.1 %)  26.0
 
  Monocytes % (1.7 - 9.3 %)  7.0
 
  Eosinophils % (0 - 5 %)  7.9  H
 
  Basophils % (0.0 - 2.0 %)  0.7
 
  Absolute Granulocytes (1.4 - 6.5 /CUMM)  5.5
 
  Absolute Lymphocytes (1.2 - 3.4 /CUMM)  2.5
 
  Absolute Monocytes (0.10 - 0.60 /CUMM)  0.7  H
 
  Absolute Eosinophils (0.0 - 0.7 /CUMM)  0.7
 
  Absolute Basophils (0.0 - 0.2 /CUMM)  0.1
 
Urines  
 
  Urine Color (YEL,AMB,STR) YEL 
 
  Urine Clarity (CLEAR) CLEAR 
 
  Urine pH (5.0 - 8.0) 6.5 
 
  Ur Specific Gravity (1.001 - 1.035) 1.015 
 
  Urine Protein (NEG,<30 MG/DL) NEG 
 
  Urine Ketones (NEG) NEG 
 
  Urine Nitrite (NEG) NEG 
 
  Urine Bilirubin (NEG) NEG 
 
  Urine Urobilinogen (0.1  -  1.0 EU/dl) 0.2 
 
  Ur Leukocyte Esterase (NEG) SMALL  H 
 
  Ur Microscopic SEDIMENT EXAMINED 
 
  Urine RBC (0 - 5 /HPF) 5-10  H 
 
  Urine WBC (0 - 2 /HPF) 3-5  H 
 
  Ur Epithelial Cells (NONE,FEW) FEW 
 
  Urine Hemoglobin (NEG) LARGE  H 
 
  Urine Glucose (N MG/DL) NEG 
 
 
 
 
Assessment/Plan
Assessment/Plan
Patient is a 38 year old female in the postpartum setting with multiple risk 
factors for preeclampsia now with HTN and CNS symptoms.  These meet criteria for
superimposed preeclampsia.
No findings of HEELP syndrome.
 
1) Admit to ICU for monitoring. Goals for BP are to keep below 150/100.  To 
start on nifedipine 10 mg x 1.  If no control than add Procardia 30 mg XL.  
Patient's pulse is 60's and may be too low for starting betablocker.  She wasnt 
on BP meds in the pregnancy as she had BP in the range of 120-130/60-70.  Her 
intake 24 hour urine placed her at 334 mg of protein.  UA at present is 
negative.  However 10% of patients with negative protein may still have 
eclampsia.
2) Preeclampsia management with Magnesium sulfate for seizure prophylaxis.  At 
present as long as BP normalize and diuresis of greater than 100 cc per hour 
over 2 hours and has at least 8 hours of magnesium than patient should be clear 
of same.  In the past 24 hours of magnesium was given however this put patient's
at risk for vte.  No need for CT imaging at present at no seizure activity.
Hernandez to be placed for monitoring of urine output.
3) VTE risk reduction with alps.
4) Serial labs to be done q8 hours for assessment of heelp syndrome.  Not noted 
at present.
5) Not breast feeding.
6) Treatment goals reviewed with patient and family.
 
 
Problem List:
 1. Preeclampsia in postpartum period
 
 2. History of gestational diabetes
 
Other Findings/Comments:
Discussed care with Dr Melo
Copies To:
Corky CEDILLO,Jerad
 
Consult Acknowledgment
- Thank you for your consult request.
 
Attending MD Review Statement
 
Attending Statement
Attending MD Statement: examined this patient, discussed w/nursing
Attending Assessment/Plan:
as above

## 2018-04-11 NOTE — PN- OBGYN
Surgical Brief Attending Note
Brief Attending Note:
Seen and evaluated in the ICU
States headache still present.
States didnt sleep well.
Denies nausea nor vomiting.
Vitals BP max 160/90 and now 130/70 Pulse 100's Pulse ox 98%
Aox3
Mild distress
1100 cc diuresis from admission
 
A/P
HD#1 and postpartum day number 6 for this female with postpartum preeclampsia.
1) Maintain on magnesium.  Rate increased to 2.5 grams per hour as level was 
3.4.  Goal for 4-8.  Good diuresis noted.  Already on 8 hours of magnesium.  
Once headache resolves and continued diuresis I will cease the magnesium.  As BP
in the evening did reach 160 systolic x 2 I will add labetalol 100 mg BID.  She 
did have nifedipine x 1 of 10 mg.  parameters for same placed in system.
2) CNS symptoms.  patient was without headache this pregnancy and this is new 
onset.  More likely to be preeclampsia related however migraine and or sleep 
deprivation is in ddx.  Given motrin and tylenol and added periactin.  Will 
reevaluate.
3) Labs show hemoconcentration but no findings of heelp.  Allow clear liquid 
diet.  
4) VTE prophylaxis with Alps.  No findings of VTE at present.
5) History of anxiety.  Start klonopin at 05 mg bid as needed.  
6) Most likely for discharge 4/12/18

## 2018-04-11 NOTE — CONS- CRCU
General Information and HPI
 
Consulting Request
Date of Consult: 18
Requested By:
OB
History of Present Illness:
Patient is a 38 year old female who is PPD #6 s/p vaginal birth.  Pregnancy 
complicated by history of HTN, GDMa2 and obesity.  She was in usual state of 
health today when noticed persistant headache despite tylenol and visual 
changes.  She also noted her BP today to be between 130-160/80-90.
Denies RUQ pain.
Mild cramps 
Mild lochia.
Not breast feeding.
Swollen legs
Now in icu follwed by high risk ob aswell
Mild head ache now
No othrer symptoms
 
Has past history of migrane, and was on betablocker prior to pregnancy (which 
was held during the pregnancy)
 
Since she came into the ICU she is being treated with magnesium, one dose of 
nifedipine 10 mg, has a Hernandez catheter.  Her blood pressure has improved.  She 
is undergoing spontaneous diuresis.  And her magnesium was 3.6 which is now 
being increased.  She is relatively stable.
 
No other significant complaints review of symptoms otherwise unremarkable 
patient was slightly teary-eyed.  She does have history of anxiety.
 
Allergies/Medications
Allergies:
Coded Allergies:
Sulfa (Sulfonamide Antibiotics) (Intermediate, RASH/HIVES 04/10/18)
adhesive tape (Intermediate, ITCHING 04/10/18)
latex (Intermediate, ITCHY 04/10/18)
 
Home Med List:
Cholecalciferol (Vitamin D3) 1,000 UNIT TABLET   1 TAB PO DAILY VITAMIN SUPPORT 
(Reported)
Prenatal Vit No.130/Iron/FA (Prenatal Tablet) 27 MG IRON-800 MCG TABLET   1 TAB 
PO DAILY PREGNANCY  (Reported)
 
 
Review of Systems
 
Review of Systems
Constitutional:
Reports: see HPI. 
Comments
Denies: no symptoms. 
EENTM:
Reports: visual changes. 
Cardiovascular:
Reports: peripheral edema. 
Respiratory:
Denies: no symptoms. 
GI:
Denies: no symptoms. 
Genitourinary:
Denies: no symptoms. 
Musculoskeletal:
Denies: no symptoms. 
Skin:
Denies: no symptoms. 
Neurological/Psychological:
Reports: anxiety, headache. 
Hematologic/Endocrine:
Denies: no symptoms. 
Immunologic/Allergic:
Denies: no symptoms. 
 
 
Past History
 
Travel History
Traveled to [place] past 21 day No
 
Medical History
Blood Transfusion Hx: No
Neurological: migraine
EENT: NONE
Cardiovascular: hypertension
Respiratory: NONE
Gastrointestinal: NONE
Hepatic: NONE
Renal: NONE
Musculoskeletal: NONE
Psychiatric: NONE
Endocrine: NONE
Blood Disorders: NONE
Cancer(s): NONE
GYN/Reproductive: NONE
Other Medical Hx:
GDMA2
 History of  birth
 
Surgical History
Surgical History: non-contributory, CHOLEYCYSTECTOMY
 
Psychosocial History
Where Do You Live? Home
Who Do You Live With? spouse, child
Services at Home: None
Primary Language: English
Smoking Status: Never Smoked
ETOH Use: denies use
Illicit Drug Use: denies illicit drug use
Living Will? unknown
Power of /HCP? unknown
Other Social History:
NA
 
Functional Ability
ADLs
Independent: dressing, eating, toileting, bathing. 
Ambulation: independent
IADLs
Independent: shopping, housework, finances, food prep, telephone, transportation
, medication admin. 
 
Employment History
Employment: Employed
 
Exam & Diagnostic Data
Last 24 Hrs of Vital Signs/I&O
 Vital Signs
 
 
Date Time Temp Pulse Resp B/P B/P Pulse O2 O2 Flow FiO2
 
     Mean Ox Delivery Rate 
 
 0302 97.2 98 18 133/80  97 Room Air  
 
 0232 97.1 83 18 133/79  97 Room Air  
 
 0202 98.0 76 18 142/77  97 Room Air  
 
 0131 98.0 81 18 136/72  97 Room Air  
 
 0116 98.0 64 18 147/79  96 Room Air  
 
 0101 98.0 70 18 161/79  98 Room Air  
 
 0047 98.0 65 18 157/75  98 Room Air  
 
 0044  56 18 164/77  97 Room Air  
 
 0000  63 18 157/73  98 Room Air  
 
04/10 2331      99 Room Air  
 
04/10 2247    156/88     
 
04/10 2141 97.7 66 20 161/99  98 Room Air  
 
 
 Intake & Output
 
 
  1600  0800  0000
 
Intake Total  120 
 
Output Total  2600 
 
Balance  -2480 
 
    
 
Intake, Oral  120 
 
Output, Urine  2600 
 
Patient  248 lb 248 lb
 
Weight   
 
 
 
Last 48 Hrs of Labs/Zack:
 Laboratory Tests
 
18 0430:
Anion Gap 15, Estimated GFR > 60, Glucose 112  H, Calcium 9.1, Phosphorus 5.5  H
, Magnesium 3.4  H, Total Bilirubin 0.4, AST 24, ALT 40, Albumin 3.6, CBC w Diff
NO MAN DIFF REQ, RBC 4.32, MCV 91.8, MCH 31.1  H, MCHC 34.0, RDW 15.0  H, MPV 
7.6, Gran % 68.6, Lymphocytes % 18.2  L, Monocytes % 6.2, Eosinophils % 6.7  H, 
Basophils % 0.3, Absolute Granulocytes 7.4  H, Absolute Lymphocytes 2.0, 
Absolute Monocytes 0.7  H, Absolute Eosinophils 0.7, Absolute Basophils 0
 
04/10/18 2212:
Urine Color YEL, Urine Clarity CLEAR, Urine pH 6.5, Ur Specific Gravity 1.015, 
Urine Protein NEG, Urine Ketones NEG, Urine Nitrite NEG, Urine Bilirubin NEG, 
Urine Urobilinogen 0.2, Ur Leukocyte Esterase SMALL  H, Ur Microscopic SEDIMENT 
EXAMINED, Urine RBC 5-10  H, Urine WBC 3-5  H, Ur Epithelial Cells FEW, Urine 
Hemoglobin LARGE  H, Urine Glucose NEG
 
04/10/18 2208:
Anion Gap 12, Estimated GFR > 60, BUN/Creatinine Ratio 28.3  H, Glucose 101  H, 
Calcium 9.7, Magnesium 1.5  L, Total Bilirubin 0.3, AST 24, ALT 36, Alkaline 
Phosphatase 80, Troponin I 0.02, Total Protein 6.0  L, Albumin 3.3  L, Globulin 
2.7, Albumin/Globulin Ratio 1.2, CBC w Diff NO MAN DIFF REQ, RBC 3.86  L, MCV 
92.5, MCH 30.6, MCHC 33.1, RDW 14.8  H, MPV 7.3  L, Gran % 58.4, Lymphocytes % 
26.0, Monocytes % 7.0, Eosinophils % 7.9  H, Basophils % 0.7, Absolute 
Granulocytes 5.5, Absolute Lymphocytes 2.5, Absolute Monocytes 0.7  H, Absolute 
Eosinophils 0.7, Absolute Basophils 0.1
 
 
Assessment/Plan CRCU
Impression/Plan:
General Appearance: well developed/nourished, mild distress
Head: atraumatic
Eyes:
Bilateral: normal appearance. 
Neck: full range of motion
Respiratory: normal breath sounds
Cardiovascular: regular rate/rhythm
Gastrointestinal: soft, non-tender
Back: normal inspection
Extremities: Edema 1-2+  negative tonia's
Neurologic/Psych: no motor/sensory deficits, awake, alert, oriented x 3, normal 
mood/affect (reflexes 2+)
Cranial Nerves: normal hearing, normal speech
Skin: intact, normal color
 
IMPRESSION
 
This is a lady with previous history of hypertension, gestational diabetes, 
obesity, anxiety, previous migraine headache with on and off headache now comes 
into the hospital with mild persistent headache, slightly elevated blood 
pressure, pedal edema, mild anxiety.  She is postpartum day 6 with multiple risk
for preeclampsia now with mild hypertension and mild CNS symptoms with evidence 
of eclampsia now needing intensive care admission.
 
PLAN
* Monitor blood pressure
* Blood pressure goal is to keep her below 150/100
* Patient has received nifedipine, as she is now slightly tachycardic and has 
little more headache and prior with the nifedipine will start her on labetalol 
100 twice a day which could be escalated.
* Continue magnesium drip which has already been started and goal magnesium is 
between 4-8 and watch her for magnesium-induced neuromuscular issues
* Watch her first spontaneous diuresis
* Start her on Lovenox 40 once a day
* Patient is not going to be breast-feeding 
* Serial labs every 8 hours to assess heelp syndrome.  No evidence suggestive of
heep syndrome at this present time
* Neuro imaging if she does have worsening headache or worsening high blood 
pressure with neuromuscular debility including seizure
* High-risk OB note reviewed and appreciated
* Continue with clear liquid diet and can change to full liquid diet if patient 
desires
* Tylenol and as needed Motrin. Pt has been started on cyproheptadine by OB to 
continue
* Agree with clonazepam 0.5 twice a day
* Increase magnesium drip as the last level was 3.5
Patient is critically ill total time spent 36 minutes remaining ICU for today
 
 
Consult Acknowledgment
- Thank you for your consult request.

## 2018-04-11 NOTE — PN- OBGYN
Surgical Brief Attending Note
Brief Attending Note:
Follow up note.
Seen at bedside with Medical team
Headache resolved.
/70 Pulse 90s
Lungs clear
Fundus firm at 20 cm
Urine output 700 cc since 7 am.
A/P
Resolving preeclampsia
May dc magnesium as good diuresis of 2000 cc since admission. Headache resolved.
One additional set of labs for assessment of HEELP.
Lovenox 40 mg daily sq for vte prophylaxis
BP controlled on beta blocker. Side effects reviewed
If intolerant than ace inhibitor justified.
Step down to regular floor and dc home 4/12/18 with one week follow up for bp 
assessment.

## 2018-04-12 VITALS — SYSTOLIC BLOOD PRESSURE: 130 MMHG | DIASTOLIC BLOOD PRESSURE: 80 MMHG

## 2018-04-12 LAB
ABSOLUTE GRANULOCYTE CT: 5.7 /CUMM (ref 1.4–6.5)
BASOPHILS # BLD: 0 /CUMM (ref 0–0.2)
BASOPHILS NFR BLD: 0.2 % (ref 0–2)
EOSINOPHIL # BLD: 0.6 /CUMM (ref 0–0.7)
EOSINOPHIL NFR BLD: 6.7 % (ref 0–5)
ERYTHROCYTE [DISTWIDTH] IN BLOOD BY AUTOMATED COUNT: 14.7 % (ref 11.5–14.5)
GRANULOCYTES NFR BLD: 66.6 % (ref 42.2–75.2)
HCT VFR BLD CALC: 37.8 % (ref 37–47)
LYMPHOCYTES # BLD: 1.6 /CUMM (ref 1.2–3.4)
MCH RBC QN AUTO: 31 PG (ref 27–31)
MCHC RBC AUTO-ENTMCNC: 33.6 G/DL (ref 33–37)
MCV RBC AUTO: 92.2 FL (ref 81–99)
MONOCYTES # BLD: 0.7 /CUMM (ref 0.1–0.6)
PLATELET # BLD: 410 /CUMM (ref 130–400)
PMV BLD AUTO: 7.2 FL (ref 7.4–10.4)
RED BLOOD CELL CT: 4.11 /CUMM (ref 4.2–5.4)
WBC # BLD AUTO: 8.6 /CUMM (ref 4.8–10.8)

## 2018-04-12 NOTE — PATIENT DISCHARGE INSTRUCTIONS
Discharge Instructions
 
General Discharge Information
You were seen/treated for:
- Hypertension
- Pre-ecclampsia
 
Watch for these problems:
- Severe headache, Elevated BP
- Vision changes, Abdominal pain
 
Special Instructions:
1. Please follow up with your Primary care provider within one week of 
discharge. 
 
2. Please follow up with your Ob-Gyn within one week of discharge. 
 
Acute Coronary Syndrome
 
Inclusion Criteria
At DC or during hospital stay patient has or had the following:
ACS DIAGNOSIS No
 
Discharge Core Measures
Meds if any: Prescribed or Continued at Discharge
Meds if any: NOT Prescribed or Continued at Discharge
 
Congestive Heart Failure
 
Inclusion Criteria
At DC or during hospital stay patient has or had the following:
CHF DIAGNOSIS No
 
Discharge Core Measures
Meds if any: Prescribed or Continued at Discharge
Meds if any: NOT Prescribed or Continued at Discharge
 
Cerebrovascular accident
 
Inclusion Criteria
At DC or during hospital stay patient has or had the following:
CVA/TIA Diagnosis No
 
Discharge Core Measures
Meds if any: Prescribed or Continued at Discharge
Meds if any: NOT Prescribed or Continued at Discharge
 
Venous thromboembolism
 
Inclusion Criteria
VTE Diagnosis No
VTE Type NONE
VTE Confirmed by (Test) NONE
 
Discharge Core Measures
- Per Current guidelines, there needs to be overlap
- treatment for the first 5 days of Warfarin therapy.
- If discharged on Warfarin prior to 5 days of
- overlap therapy, the patient will need to be
- assessed for post discharge needs including
- *Post discharge parental anticoagulation
- *Warfarin and/or parental anticoagulation education
- *Follow up date to check INR post discharge
At least 5 days overlap therapy as Inpatient No
Meds if any: Prescribed or Continued at Discharge
Note: Overlap Therapy is Warfarin and Anticoagulant
Meds if any: NOT Prescribed or Continued at Discharge

## 2018-04-12 NOTE — PN- HOUSESTAFF
Farhan King 18 0759:
Subjective
Follow-up For:
- Hypertension
Complaints: no complaints
Subjective:
 
No new complaints. She had headache yesterday, which is improved. BP remained 
stable overnight.
 
Temperature 98.4, heart rate , normal sinus rhythm, respiration 15-18, SBP
116-129/DBP 62-80. Ins and Outs were not measured since she was downgraded to 
gen med service. 
 
Review of Systems
Constitutional:
Reports: see HPI. 
 
Objective
Last 24 Hrs of Vital Signs/I&O
 Vital Signs
 
 
Date Time Temp Pulse Resp B/P B/P Pulse O2 O2 Flow FiO2
 
     Mean Ox Delivery Rate 
 
 2318 98.4 72 16 120/62  96 Room Air Room Air 
 
 2224  80  110/62     
 
 2200       Room Air  
 
 1400 98.8 88 15 120/68  98 Room Air  
 
 1033  100  129/80     
 
 
 Intake & Output
 
 
  1600  0800  0000
 
Intake Total  300 200
 
Output Total   
 
Balance  300 200
 
    
 
Intake, Oral  300 200
 
Number  0 
 
Bowel   
 
Movements   
 
 
 
 
Physical Exam
General Appearance: No Acute Distress
Other Physical Findings:
 
General Exam: AAOx3, No acute distress, 
Skin: No rashes, no breakdown
HEENT: PERRLA, EOMI
Neck: Supple, No JVD
No cervical lymphadenopathy
CVS: Reg Rate, Normal S1,S2, No MGR
Resp: Normal air entry, no ronchi/rales
Abdomen: Soft, No tenderness, Normal Bowel Sounds
Neuro: Normal Speech, Strength 5/5 b/l x 4 extremities, Sensation intact, CN III
-XII NL, Reflexes 2+
Extremities: No cyanosis, trace pedal edema
Current Medications:
 Current Medications
 
 
  Sig/Brody Start time  Last
 
Medication Dose Route Stop Time Status Admin
 
Acetaminophen 650 MG Q4P PRN  0030 AC 
 
  PO   
 
Cholecalciferol 1,000 IU DAILY  1000 AC 
 
  PO   103
 
Clonazepam 1 MG .STK-MED ONE 2219 DC 
 
  PO 2220  
 
Clonazepam 0.5 MG BID  AC 
 
  PO  0859  2226
 
Cyproheptadine HCl 4 MG TID PRN  0900 AC 
 
  PO   0631
 
Enoxaparin Sodium 40 MG DAILY  1000 AC 
 
  SC   1620
 
Ibuprofen 400 MG Q6P PRN  0030 AC 
 
  PO   0633
 
Labetalol HCl 100 MG BID  1000 AC 
 
  PO   2224
 
Lactated Ringer's 1,000 ML Q20H 04/10 2345 DC 
 
  IV   0104
 
Magnesium Sulfate 2.5 GM SEE RATE  0930 CAN 
 
  IV   
 
Magnesium Sulfate 40 GM ONE  0930 DC 
 
Water 1,000 ML IV   
 
Magnesium Sulfate 40 GM ONCE ONE  0015 DC 
 
Sterile Water 1,000 ML IV 
 
Multivitamins 1 TAB DAILY  1000 AC 
 
  PO   1033
 
 
 
 
Last 24 Hrs of Lab/Zack Results
Last 24 Hrs of Labs/Mics:
 Laboratory Tests
 
18 0630:
Sodium Pending, Potassium Pending, Chloride Pending, Carbon Dioxide Pending, 
Anion Gap Pending, BUN Pending, Creatinine Pending, Glucose Pending, Calcium 
Pending, Phosphorus Pending, Magnesium Pending, Total Bilirubin Pending, AST 
Pending, ALT Pending, Albumin Pending, CBC w Diff NO MAN DIFF REQ, RBC 4.11  L, 
MCV 92.2, MCH 31.0, MCHC 33.6, RDW 14.7  H, MPV 7.2  L, Gran % 66.6, Lymphocytes
% 18.9  L, Monocytes % 7.6, Eosinophils % 6.7  H, Basophils % 0.2, Absolute 
Granulocytes 5.7, Absolute Lymphocytes 1.6, Absolute Monocytes 0.7  H, Absolute 
Eosinophils 0.6, Absolute Basophils 0
 
18 1630:
CBC w Diff NO MAN DIFF REQ, RBC 4.19  L, MCV 92.0, MCH 30.7, MCHC 33.3, RDW 14.9
 H, MPV 7.4, Gran % 81.9  H, Lymphocytes % 9.4  L, Monocytes % 5.0, Eosinophils 
% 3.6, Basophils % 0.1, Absolute Granulocytes 10.1  H, Absolute Lymphocytes 1.2,
Absolute Monocytes 0.6, Absolute Eosinophils 0.4, Absolute Basophils 0
 
18 1430:
Anion Gap 12, Estimated GFR > 60, Glucose 105  H, Calcium 7.6  L, Phosphorus 5.2
 H, Magnesium 4.7  H, Total Bilirubin 0.5, AST 24, ALT 37, Albumin 3.7
 
 
Assessment/Plan
Assessment:
 
Ms Pettinicchi is a 38-year-old woman with past medical history hypertension, 
obesity, gestational diabetes (GDMa2 who is  who is PPD #6 s/p vaginal birth who
came in with a chief concern of persistent headache despite taking Tylenol and 
also had vision changes.  Blood pressure was recorded to be around 130-160s/80-
90s.  She did not have any abdominal pain, especially right upper quadrant pain.
 She is not breast-feeding. 
 
She was admitted to ICU she and was treated with magnesium, one dose of 
nifedipine 10 mg. Her blood pressure improved and stayed in between systolic 120
-130s.  She also has been diuresing well.
 
 
Pertinent lab findings:
 
WBC 9.4-->8.6()
hemoglobin 11.8-->12.7()
platelets 387-->410 
Potassium 3.5-0--> pending ()
BUN 17-->pending () 
creatinine 0.6--> 
AST 24, ALT 37, 
magnesium 1.5-->3.4-->4.7.
Phosphorous-->5.2
Calcium 7.6
 
Etiology in her case was likely preeclampsia with postpartum day 6 with 
hypertension and now mild CNS symptoms (headache), which would be eclampsia.  
She was admitted to intensive care unit for further monitoring of blood pressure
and any seizures.
 
Problem list:
 
#1 eclampsia
#2 hypertension
#3 migraine
#4 Electrolyte abnormalities- hypocalcemia, hyperphosphatemia
 
Plan:
 
#1  Eclampsia-she was started on magnesium infusion, with close monitoring of 
her diuresis.  She has been adequately diuresed, and last magnesium check was 
4.7.  Considering her duration of magnesium treatment, it was discontinued 
during the day by Dr. Fried.  In regards to her hypertension, she was started 
on labetalol p.o. 100 mg twice daily with the goal of blood pressure less than 
150/100.  If the blood pressures continues to rise, the dose of labetalol could 
be increased. Await labs from this am. 
 
Etiology for hypocalcemia, and hyperphosphatemia is likely due to 
hypermagnesemia. Would discuss with the attending, if these need to be 
corrected. Would watch it closely. 
 
 
Next labs to be done in the a.m., as per Dr. Fried.
 
Housekeeping:
 
#1 DVT prophylaxis-subcutaneous Lovenox
#2 Hernandez catheter-discontinued
#3 IV line-peripheral-magnesium discontinued
 
 
 
Problem List:
 1. Preeclampsia in postpartum period
 
 2. Hypertension
 
 3. Pre-eclampsia
 
Pain Ratin
Pain Location:
headache
Pain Goal: Pain 4 or less
Pain Plan:
tylenol and motrin
Tomorrow's Labs & Rationales:
cbc
frankp
 
 
Jessica CEDILLO,[facility] 18 1353:
Attending MD Review Statement
 
Attending Statement
Attending MD Statement: examined this patient, discuss w/resident/PA/NP, agreed 
w/resident/PA/NP, discussed with family, reviewed EMR data (avail), discussed 
with nursing, discussed with case mgmt, reviewed images, amended to note
Attending Assessment/Plan:
Stable
Dc per obgyn
will follow with gyn and pcp

## 2018-04-12 NOTE — PN- OBGYN
Surgical Brief Attending Note
Brief Attending Note:
No events overnight
Appears much better
Denies headache
BP as listed in summary
Labs checked and no evidence of HEELP syndrome
 
A/P
HD#2 for this patient with recent vaginal birth and history of GDMA2 and obesity
with postpartum preeclampsia.
DC home today
BP controlled on beta blocker.  No adverse side effects noted.  Monitor BP at 
home as she has cuff.  To report any values over 160/105.
Resolution of preeclampsia is noted with good diuresis and resolution of 
symptoms.
VTE prophylaxis with ambulation.
GDM history.  6-12 week 75 gram GTT.
Risks of daibetes and CVA in future given GDM and preeclampsia
1 week follow up for BP check
4 week postpartum follow up
IUD in 6 weeks.
Maintain on prenatal and she may continue 500 mg of metformin at home given PCO.
 She isnt breast feeding.
patient explained treatment plan.  Face to face time 15 minutes.  Informed house
officers of VT plan.

## 2020-08-16 NOTE — SURG SHORT-STAY <48HRS DIS SUM
Visit Information
 
Visit Dates
Admission Date:
04/10/18
 
Discharge Date:
4/12/18
 
 
Surgical Short Stay DC Summary
Admission Diagnosis:
Postpartum preeclampsia
Final Diagnosis:
same
Procedure(s):
Monitoring
Magnesium therapy
Summary/Significant Findings:
Elevated bp without heelp syndrome
Condition at Discharge:
Stable
Discharge Disposition: home or self care
Discharge instructions provided to patient/family: Yes
Post discharge follow-up plan:
1 week for BP assessment at Dr Fried
Copies to:
Corky CEDILLO,Jerad
no